# Patient Record
Sex: FEMALE | Race: WHITE | NOT HISPANIC OR LATINO | URBAN - METROPOLITAN AREA
[De-identification: names, ages, dates, MRNs, and addresses within clinical notes are randomized per-mention and may not be internally consistent; named-entity substitution may affect disease eponyms.]

---

## 2019-01-21 ENCOUNTER — OFFICE VISIT (OUTPATIENT)
Dept: URBAN - METROPOLITAN AREA CLINIC 16 | Facility: CLINIC | Age: 84
End: 2019-01-21
Payer: MEDICARE

## 2019-01-21 DIAGNOSIS — H18.50 UNSPECIFIED HEREDITARY CORNEAL DYSTROPHIES: ICD-10-CM

## 2019-01-21 PROCEDURE — 92014 COMPRE OPH EXAM EST PT 1/>: CPT | Performed by: OPTOMETRIST

## 2019-01-21 ASSESSMENT — INTRAOCULAR PRESSURE
OD: 10
OS: 10

## 2019-01-21 ASSESSMENT — KERATOMETRY: OD: 40.25

## 2019-01-21 NOTE — IMPRESSION/PLAN
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 Plan: Discussed diagnosis. Will continue to observe.  Consult with Dr Eulogio Dalal if desires

## 2021-04-19 ENCOUNTER — OFFICE VISIT (OUTPATIENT)
Dept: URBAN - METROPOLITAN AREA CLINIC 16 | Facility: CLINIC | Age: 86
End: 2021-04-19
Payer: COMMERCIAL

## 2021-04-19 DIAGNOSIS — H04.123 DRY EYE SYNDROME OF BILATERAL LACRIMAL GLANDS: ICD-10-CM

## 2021-04-19 DIAGNOSIS — H18.593 OTHER HEREDITARY CORNEAL DYSTROPHIES, BILATERAL: Primary | ICD-10-CM

## 2021-04-19 DIAGNOSIS — Z96.1 PRESENCE OF PSEUDOPHAKIA: ICD-10-CM

## 2021-04-19 DIAGNOSIS — H25.12 AGE-RELATED NUCLEAR CATARACT, LEFT EYE: ICD-10-CM

## 2021-04-19 DIAGNOSIS — H52.03 HYPERMETROPIA, BILATERAL: ICD-10-CM

## 2021-04-19 PROCEDURE — 99213 OFFICE O/P EST LOW 20 MIN: CPT | Performed by: OPTOMETRIST

## 2021-04-19 ASSESSMENT — INTRAOCULAR PRESSURE
OD: 10
OS: 10

## 2021-04-19 ASSESSMENT — VISUAL ACUITY: OD: 20/30

## 2021-04-19 ASSESSMENT — KERATOMETRY: OD: 40.88

## 2021-04-19 NOTE — IMPRESSION/PLAN
Impression: Presence of pseudophakia: Z96.1. Right. Plan: Discussed diagnosis. Will continue to observe.

## 2021-04-19 NOTE — IMPRESSION/PLAN
Impression: Other hereditary corneal dystrophies, bilateral: H18.593. Plan: Discussed diagnosis in detail with patient.